# Patient Record
Sex: FEMALE | Race: OTHER | NOT HISPANIC OR LATINO
[De-identification: names, ages, dates, MRNs, and addresses within clinical notes are randomized per-mention and may not be internally consistent; named-entity substitution may affect disease eponyms.]

---

## 2022-06-02 PROBLEM — Z00.00 ENCOUNTER FOR PREVENTIVE HEALTH EXAMINATION: Status: ACTIVE | Noted: 2022-06-02

## 2022-09-29 ENCOUNTER — APPOINTMENT (OUTPATIENT)
Dept: NEUROLOGY | Facility: CLINIC | Age: 27
End: 2022-09-29

## 2022-09-29 VITALS
WEIGHT: 131 LBS | HEART RATE: 76 BPM | TEMPERATURE: 97.4 F | BODY MASS INDEX: 24.11 KG/M2 | SYSTOLIC BLOOD PRESSURE: 122 MMHG | OXYGEN SATURATION: 99 % | HEIGHT: 62 IN | DIASTOLIC BLOOD PRESSURE: 80 MMHG

## 2022-09-29 DIAGNOSIS — G47.20 CIRCADIAN RHYTHM SLEEP DISORDER, UNSPECIFIED TYPE: ICD-10-CM

## 2022-09-29 PROCEDURE — 99203 OFFICE O/P NEW LOW 30 MIN: CPT

## 2022-10-18 ENCOUNTER — APPOINTMENT (OUTPATIENT)
Dept: PULMONOLOGY | Facility: CLINIC | Age: 27
End: 2022-10-18

## 2022-10-18 VITALS
HEART RATE: 63 BPM | BODY MASS INDEX: 24.84 KG/M2 | SYSTOLIC BLOOD PRESSURE: 108 MMHG | TEMPERATURE: 97.9 F | OXYGEN SATURATION: 100 % | DIASTOLIC BLOOD PRESSURE: 69 MMHG | HEIGHT: 62 IN | WEIGHT: 135 LBS

## 2022-10-18 DIAGNOSIS — Z78.9 OTHER SPECIFIED HEALTH STATUS: ICD-10-CM

## 2022-10-18 PROCEDURE — 99204 OFFICE O/P NEW MOD 45 MIN: CPT

## 2022-10-19 PROBLEM — Z78.9 NO PERTINENT PAST MEDICAL HISTORY: Status: RESOLVED | Noted: 2022-10-19 | Resolved: 2022-10-19

## 2022-10-19 RX ORDER — DIPHENHYDRAMINE HYDROCHLORIDE 12.5 MG/1
BAR, CHEWABLE ORAL
Refills: 0 | Status: ACTIVE | COMMUNITY

## 2022-10-19 NOTE — REVIEW OF SYSTEMS
[Anxious] : anxious [Difficulty Initiating Sleep] : difficulty falling asleep [Difficulty Maintaining Sleep] : difficulty maintaining sleep [Chronic Insomnia] : chronic  insomnia [Lower Extremity Discomfort] : no lower extremity discomfort [Late day/ Evening symptoms] : no late day/evening symptoms [Negative] : Musculoskeletal

## 2022-10-19 NOTE — CONSULT LETTER
[Dear  ___] : Dear  [unfilled], [Consult Letter:] : I had the pleasure of evaluating your patient, [unfilled]. [Please see my note below.] : Please see my note below. [Consult Closing:] : Thank you very much for allowing me to participate in the care of this patient.  If you have any questions, please do not hesitate to contact me. [Sincerely,] : Sincerely, [FreeTextEntry3] : Siobhan Marcano MD\par \par [employer] [facility]\par Pulmonary, Critical Care, and Sleep Medicine\par

## 2022-10-19 NOTE — HISTORY OF PRESENT ILLNESS
[FreeTextEntry1] : 28yo woman with insomnia. About 3  years ago she moved back to [place] and had [place] work times of 4AM to about noon and then would sleep. She has since had an inclination to fall asleep at 3 to 4AM and wake up later but has not been able to do so due to work. Takes "sleeping pills" (OTC: diphenhydramine) and is able to fall asleep at 10PM and wake up at around 7AM when she needs to start her day. Uses cannabis on occasion. Dad has GAIL. She does not think she snores. Is sleepy and tired during the day.  [ESS] : 6

## 2022-10-19 NOTE — PHYSICAL EXAM
[General Appearance - Well Developed] : well developed [Normal Appearance] : normal appearance [Well Groomed] : well groomed [General Appearance - Well Nourished] : well nourished [No Deformities] : no deformities [General Appearance - In No Acute Distress] : no acute distress [Normal Conjunctiva] : the conjunctiva exhibited no abnormalities [Neck Appearance] : the appearance of the neck was normal [Apical Impulse] : the apical impulse was normal [Heart Rate And Rhythm] : heart rate was normal and rhythm regular [] : no respiratory distress [Respiration, Rhythm And Depth] : normal respiratory rhythm and effort [Exaggerated Use Of Accessory Muscles For Inspiration] : no accessory muscle use [Auscultation Breath Sounds / Voice Sounds] : lungs were clear to auscultation bilaterally [Abnormal Walk] : normal gait [Nail Clubbing] : no clubbing of the fingernails [Skin Color & Pigmentation] : normal skin color and pigmentation [Oriented To Time, Place, And Person] : oriented to person, place, and time [Impaired Insight] : insight and judgment were intact

## 2022-10-19 NOTE — ASSESSMENT
[FreeTextEntry1] : 26yo with initiation and maitenance insomnia with delayed circadian rhythm sleep disorder. Is able to sleep with sleep aides but this is not acceptable to her. At this point due to her family history of GAIL will rule out SDB; referred to the [facility] for an HST. If GAIL is not diagnosed will proceed with a Benadryl taper and then will advance her circadian rhythm. Discussed sleep hygiene and sleep restriction and process for tapering as well as advancing her clock. Follow up after HST.

## 2022-11-06 ENCOUNTER — APPOINTMENT (OUTPATIENT)
Dept: SLEEP CENTER | Facility: HOME HEALTH | Age: 27
End: 2022-11-06

## 2022-11-06 ENCOUNTER — OUTPATIENT (OUTPATIENT)
Dept: OUTPATIENT SERVICES | Facility: HOSPITAL | Age: 27
LOS: 1 days | End: 2022-11-06
Payer: COMMERCIAL

## 2022-11-06 PROCEDURE — 95800 SLP STDY UNATTENDED: CPT

## 2022-11-06 PROCEDURE — 95800 SLP STDY UNATTENDED: CPT | Mod: 26

## 2022-11-07 DIAGNOSIS — G47.33 OBSTRUCTIVE SLEEP APNEA (ADULT) (PEDIATRIC): ICD-10-CM

## 2022-12-06 ENCOUNTER — APPOINTMENT (OUTPATIENT)
Dept: PULMONOLOGY | Facility: CLINIC | Age: 27
End: 2022-12-06

## 2022-12-12 ENCOUNTER — APPOINTMENT (OUTPATIENT)
Dept: PULMONOLOGY | Facility: CLINIC | Age: 27
End: 2022-12-12

## 2022-12-12 PROCEDURE — 99443: CPT | Mod: 95

## 2022-12-13 NOTE — CONSULT LETTER
[Dear  ___] : Dear  [unfilled], [Courtesy Letter:] : I had the pleasure of seeing your patient, [unfilled], in my office today. [Please see my note below.] : Please see my note below. [Consult Closing:] : Thank you very much for allowing me to participate in the care of this patient.  If you have any questions, please do not hesitate to contact me. [Sincerely,] : Sincerely, [FreeTextEntry3] : Siobhan Marcano MD\par \par [employer] [facility]\par Pulmonary, Critical Care, and Sleep Medicine\par

## 2022-12-13 NOTE — ASSESSMENT
[FreeTextEntry1] : REVIEWED\par HST 11/2022: AHI 3.6 3%; AHI 0.8 4%\par \par 26yo with initiation and maintenance insomnia with delayed circadian rhythm sleep disorder. Is able to sleep with sleep aides but this is not acceptable to her. PSG negative for SDB and PLMs. At this point would like to slowly taper her diphenhydramine; will start 1/2 tablet decrease every 7 nights. Once completed will see if she reverts to her delayed circadian rhythm and if she does will advance her until goal bedtime is reached. Discussed sleep hygiene recommendations as well. Follow up in 4 weeks.

## 2022-12-30 ENCOUNTER — APPOINTMENT (OUTPATIENT)
Dept: PULMONOLOGY | Facility: CLINIC | Age: 27
End: 2022-12-30

## 2023-01-06 ENCOUNTER — APPOINTMENT (OUTPATIENT)
Dept: PULMONOLOGY | Facility: CLINIC | Age: 28
End: 2023-01-06

## 2023-01-13 ENCOUNTER — APPOINTMENT (OUTPATIENT)
Dept: PULMONOLOGY | Facility: CLINIC | Age: 28
End: 2023-01-13
Payer: COMMERCIAL

## 2023-01-13 PROCEDURE — 99442: CPT

## 2023-01-13 NOTE — REVIEW OF SYSTEMS
[Anxious] : anxious [Difficulty Initiating Sleep] : difficulty falling asleep [Difficulty Maintaining Sleep] : difficulty maintaining sleep [Chronic Insomnia] : chronic  insomnia [Negative] : Musculoskeletal [Lower Extremity Discomfort] : no lower extremity discomfort [Late day/ Evening symptoms] : no late day/evening symptoms

## 2023-01-13 NOTE — HISTORY OF PRESENT ILLNESS
[Home] : at home, [unfilled] , at the time of the visit. [Medical Office: ([facility])___] : at the medical office located in  [Verbal consent obtained from patient] : the patient, [unfilled] [FreeTextEntry1] : 28yo woman with insomnia. About 3  years ago she moved back to [place] and had [place] work times of 4AM to about noon and then would sleep. She has since had an inclination to fall asleep at 3 to 4AM and wake up later but has not been able to do so due to work. Takes "sleeping pills" (OTC: diphenhydramine) and is able to fall asleep at 10PM and wake up at around 7AM when she needs to start her day. Uses cannabis on occasion. Dad has GAIL. She does not think she snores. Is sleepy and tired during the day. \par \par 12/12/2022\par Had PSG. Has stopped use of her OTC meds cold turkey on certain days with significant worsening of symptoms. \par \par 1/13/2023\par Has cut done Benadryl to 1/2 tablet, it is not working. Falling asleep now at 1AM and waking up between 6:30 and 7:30 AM. In bed by 10 PM. Feels unrefreshed. Using THC and other OTC supplements PRN. [ESS] : 5

## 2023-01-13 NOTE — ASSESSMENT
[FreeTextEntry1] : REVIEWED\par HST 11/2022: AHI 3.6 3%; AHI 0.8 4%\par \par 28yo with initiation and maintenance insomnia with delayed circadian rhythm sleep disorder. Has now tapered off to 1/5 of Benadryl tablet and is back to her baseline delayed circadian rhythm. However, she is still going into bed at around 10PM. She has to go into bed at times when she is likely to fall asleep which is around 1/1:30 AM. She should do this for the next 10 nights. Wakeup time should be consistent. At that point we will start to move back her bedtime in 30 minute increments every 5 to 7 nights. \par

## 2023-02-17 ENCOUNTER — APPOINTMENT (OUTPATIENT)
Dept: PULMONOLOGY | Facility: CLINIC | Age: 28
End: 2023-02-17
Payer: COMMERCIAL

## 2023-02-17 DIAGNOSIS — F51.04 PSYCHOPHYSIOLOGIC INSOMNIA: ICD-10-CM

## 2023-02-17 DIAGNOSIS — G47.21 CIRCADIAN RHYTHM SLEEP DISORDER, DELAYED SLEEP PHASE TYPE: ICD-10-CM

## 2023-02-17 PROCEDURE — 99442: CPT

## 2023-02-18 PROBLEM — F51.04 CHRONIC INSOMNIA: Status: ACTIVE | Noted: 2022-09-29

## 2023-02-18 PROBLEM — G47.21 CIRCADIAN RHYTHM SLEEP DISORDER, DELAYED SLEEP PHASE TYPE: Status: ACTIVE | Noted: 2022-10-19

## 2023-02-18 NOTE — ASSESSMENT
[FreeTextEntry1] : REVIEWED\par HST 11/2022: AHI 3.6 3%; AHI 0.8 4%\par \par 28yo with initiation and maintenance insomnia with delayed circadian rhythm sleep disorder. Has now tapered off Benadryl, and advanced her circadian rhythm. Discussed strategies to reduce her SOL in the middle of the night. Reinforced need to be consistent to maintain positive changes. Follow up as needed.

## 2023-02-18 NOTE — HISTORY OF PRESENT ILLNESS
[Home] : at home, [unfilled] , at the time of the visit. [Medical Office: ([facility])___] : at the medical office located in  [Verbal consent obtained from patient] : the patient, [unfilled] [FreeTextEntry1] : 26yo woman with insomnia. About 3  years ago she moved back to [place] and had [place] work times of 4AM to about noon and then would sleep. She has since had an inclination to fall asleep at 3 to 4AM and wake up later but has not been able to do so due to work. Takes "sleeping pills" (OTC: diphenhydramine) and is able to fall asleep at 10PM and wake up at around 7AM when she needs to start her day. Uses cannabis on occasion. Dad has GAIL. She does not think she snores. Is sleepy and tired during the day. \par \par 12/12/2022\par Had PSG. Has stopped use of her OTC meds cold turkey on certain days with significant worsening of symptoms. \par \par 1/13/2023\par Has cut done Benadryl to 1/2 tablet, it is not working. Falling asleep now at 1AM and waking up between 6:30 and 7:30 AM. In bed by 10 PM. Feels unrefreshed. Using THC and other OTC supplements PRN.\par \par 2/16/2023\par Doing better. Bedtime is now around 11PM. Still has periods of long SOL in the middle of the night but improving. [ESS] : 5